# Patient Record
Sex: MALE | Race: WHITE | NOT HISPANIC OR LATINO | Employment: FULL TIME | URBAN - METROPOLITAN AREA
[De-identification: names, ages, dates, MRNs, and addresses within clinical notes are randomized per-mention and may not be internally consistent; named-entity substitution may affect disease eponyms.]

---

## 2024-01-19 NOTE — PROGRESS NOTES
PT Evaluation     Today's date: 2024  Patient name: Nghia Jimenez  : 1995  MRN: [id number]  Referring provider: Ana Lilia Mobley MD  Dx:   Encounter Diagnosis     ICD-10-CM    1. Atypical face pain  G50.1                      Assessment  Assessment details: Nghia Jimenez is a 29 y.o. male presenting to physical therapy with right sided jaw pain.  Patient presents with pain, decreased joint mobility and end range of motion and decreased tolerance to chewing activity. He appears to lack end range mobility and would benefit from at home mobilization techniques to assist in this motion. Patient would benefit from skilled physical therapy services to address these impairments and to maximize function. Thank you for the referral.   Impairments: abnormal or restricted ROM, activity intolerance and pain with function  Understanding of Dx/Px/POC: excellent  Goals  Impairment Goals:  1.) Pt will have decreased sx at worst by 50% in 2-4 weeks.  2.) Pt will have improved pain levels with end range jaw mobilizations to 0/10 in 4-6 weeks.    Functional Goals:  1.) Pt will be independent in their home exercise program in 1 week.  2.) Pt will be able to chew any type of food without pain in 4-6 weeks.  3.) Pt will be able to complete all ADL's without jaw pain in 6-8 weeks.  4.) Pt will have an improved FOTO score of 75/100 in 6-8 weeks.      Plan  Patient would benefit from: skilled PT  Planned therapy interventions: joint mobilization, manual therapy, neuromuscular re-education, patient education, postural training, home exercise program, graded activity and therapeutic exercise  Frequency: 1x week  Duration in weeks: 8  Plan of Care beginning date: 2024  Plan of Care expiration date: 3/18/2024  Treatment plan discussed with: patient      Subjective Evaluation    History of Present Illness  Mechanism of injury: Nghia Jimenez is a 29 y.o. male presenting to physical therapy with right sided jaw pain. He explains that the  issue started back in early October when he woke up with ear pain. He thought this was more related to his congestion and a cold at the time. After the cold subsided he continues with jaw pain which prompted him to see his dentist.     His pain is much better at this point. He was changing his diet but no longer needs to do so seeing that his pain has improved. He does feel that he can open easily but fully closing down on the right side of the jaw is irritating. He feels a deep, achy discomfort. He denies clicking and popping. He denies clenching his jaw at night time but thinks he might have been doing this at the time because he was also dealing with a sensitive skin issue. At this poin there are some days he doesn't notice it. Chewing something on the right side, granular (pork) bothers it.  Patient Goals  Patient goals for therapy: decreased pain, increased motion and return to sport/leisure activities    Pain  Current pain ratin  At best pain ratin  At worst pain ratin  Quality: sharp, tight and dull ache  Relieving factors: change in position  Progression: improved        Objective     Concurrent Complaints  Positive for disturbed sleep. Negative for night pain    Palpation     Right   No palpable tenderness to the suboccipitals, upper trapezius, masseter, temporalis, lateral pterygoid and medial pterygoid.     Active Range of Motion   Cervical/Thoracic Spine     Normal active range of motion  TMJ   Jaw observations: facial symmetry within normal limits  normal jaw occlusion  good oral hygiene  Scalloping of tongue: no  Joint sounds left: normal  Joint sounds right: normal  Lateral bite test, Left: no pain  Lateral bite test, right: no pain  Opening (mm): within normal limits   Lateral excursion, left (mm): within normal limits  Lateral excursion, right (mm)t: within normal limits   ROM comments: Slight discomfort reproduced at end range joint mobilizations in both opening and closing phases at  TMJ             Precautions: None      Manuals 1/22                                                                Neuro Re-Ed                                                                                                        Ther Ex             One sided forward jaw mob 10x5''            Self distraction 10x5''            Posterior cervical retraction 10x5''                                                                             Ther Activity                                       Gait Training                                       Modalities

## 2024-01-22 ENCOUNTER — EVALUATION (OUTPATIENT)
Dept: PHYSICAL THERAPY | Facility: CLINIC | Age: 29
End: 2024-01-22

## 2024-01-22 DIAGNOSIS — G50.1 ATYPICAL FACE PAIN: Primary | ICD-10-CM

## 2024-01-22 PROCEDURE — 97161 PT EVAL LOW COMPLEX 20 MIN: CPT | Performed by: PHYSICAL THERAPIST

## 2024-01-22 NOTE — LETTER
2024    Ana Lilia Mobley MD  [address] [facility] [address]    Patient: Nghia Jimenez   YOB: 1995   Date of Visit: 2024     Encounter Diagnosis     ICD-10-CM    1. Atypical face pain  G50.1           Dear Dr. Mobley:    Thank you for your recent referral of Nghia Jimenez. Please review the attached evaluation summary from Nghia's recent visit.     Please verify that you agree with the plan of care by signing the attached order.     If you have any questions or concerns, please do not hesitate to call.     I sincerely appreciate the opportunity to share in the care of one of your patients and hope to have another opportunity to work with you in the near future.       Sincerely,    Thad Pereira, PT      Referring Provider:      I certify that I have read the below Plan of Care and certify the need for these services furnished under this plan of treatment while under my care.                    Ana Lilia Mobley MD  [address] [facility] [address]  Via Fax: [phone]          PT Evaluation     Today's date: 2024  Patient name: Nghia Jimenez  : 1995  MRN: [id number]  Referring provider: Ana Lilia Mobley MD  Dx:   Encounter Diagnosis     ICD-10-CM    1. Atypical face pain  G50.1                      Assessment  Assessment details: Nghai Jimenez is a 29 y.o. male presenting to physical therapy with right sided jaw pain.  Patient presents with pain, decreased joint mobility and end range of motion and decreased tolerance to chewing activity. He appears to lack end range mobility and would benefit from at home mobilization techniques to assist in this motion. Patient would benefit from skilled physical therapy services to address these impairments and to maximize function. Thank you for the referral.   Impairments: abnormal or restricted ROM, activity intolerance and pain with function  Understanding of Dx/Px/POC: excellent  Goals  Impairment Goals:  1.) Pt will have decreased sx at worst by 50% in 2-4  weeks.  2.) Pt will have improved pain levels with end range jaw mobilizations to 0/10 in 4-6 weeks.    Functional Goals:  1.) Pt will be independent in their home exercise program in 1 week.  2.) Pt will be able to chew any type of food without pain in 4-6 weeks.  3.) Pt will be able to complete all ADL's without jaw pain in 6-8 weeks.  4.) Pt will have an improved FOTO score of 75/100 in 6-8 weeks.      Plan  Patient would benefit from: skilled PT  Planned therapy interventions: joint mobilization, manual therapy, neuromuscular re-education, patient education, postural training, home exercise program, graded activity and therapeutic exercise  Frequency: 1x week  Duration in weeks: 8  Plan of Care beginning date: 1/22/2024  Plan of Care expiration date: 3/18/2024  Treatment plan discussed with: patient      Subjective Evaluation    History of Present Illness  Mechanism of injury: Nghia Jimenez is a 29 y.o. male presenting to physical therapy with right sided jaw pain. He explains that the issue started back in early October when he woke up with ear pain. He thought this was more related to his congestion and a cold at the time. After the cold subsided he continues with jaw pain which prompted him to see his dentist.     His pain is much better at this point. He was changing his diet but no longer needs to do so seeing that his pain has improved. He does feel that he can open easily but fully closing down on the right side of the jaw is irritating. He feels a deep, achy discomfort. He denies clicking and popping. He denies clenching his jaw at night time but thinks he might have been doing this at the time because he was also dealing with a sensitive skin issue. At this poin there are some days he doesn't notice it. Chewing something on the right side, granular (pork) bothers it.  Patient Goals  Patient goals for therapy: decreased pain, increased motion and return to sport/leisure activities    Pain  Current pain  ratin  At best pain ratin  At worst pain ratin  Quality: sharp, tight and dull ache  Relieving factors: change in position  Progression: improved        Objective     Concurrent Complaints  Positive for disturbed sleep. Negative for night pain    Palpation     Right   No palpable tenderness to the suboccipitals, upper trapezius, masseter, temporalis, lateral pterygoid and medial pterygoid.     Active Range of Motion   Cervical/Thoracic Spine     Normal active range of motion  TMJ   Jaw observations: facial symmetry within normal limits  normal jaw occlusion  good oral hygiene  Scalloping of tongue: no  Joint sounds left: normal  Joint sounds right: normal  Lateral bite test, Left: no pain  Lateral bite test, right: no pain  Opening (mm): within normal limits   Lateral excursion, left (mm): within normal limits  Lateral excursion, right (mm)t: within normal limits   ROM comments: Slight discomfort reproduced at end range joint mobilizations in both opening and closing phases at TMJ             Precautions: None      Manuals                                                                 Neuro Re-Ed                                                                                                        Ther Ex             One sided forward jaw mob 10x5''            Self distraction 10x5''            Posterior cervical retraction 10x5''                                                                             Ther Activity                                       Gait Training                                       Modalities